# Patient Record
Sex: MALE | Race: WHITE | NOT HISPANIC OR LATINO | Employment: FULL TIME | URBAN - METROPOLITAN AREA
[De-identification: names, ages, dates, MRNs, and addresses within clinical notes are randomized per-mention and may not be internally consistent; named-entity substitution may affect disease eponyms.]

---

## 2017-08-04 ENCOUNTER — OFFICE VISIT (OUTPATIENT)
Dept: CARDIOLOGY | Facility: CLINIC | Age: 72
End: 2017-08-04

## 2017-08-04 VITALS
HEIGHT: 72 IN | SYSTOLIC BLOOD PRESSURE: 140 MMHG | HEART RATE: 63 BPM | DIASTOLIC BLOOD PRESSURE: 100 MMHG | BODY MASS INDEX: 31.29 KG/M2 | WEIGHT: 231 LBS

## 2017-08-04 DIAGNOSIS — I48.92 ATRIAL FLUTTER BY ELECTROCARDIOGRAM (HCC): Primary | ICD-10-CM

## 2017-08-04 PROCEDURE — 99204 OFFICE O/P NEW MOD 45 MIN: CPT | Performed by: INTERNAL MEDICINE

## 2017-08-04 PROCEDURE — 93000 ELECTROCARDIOGRAM COMPLETE: CPT | Performed by: INTERNAL MEDICINE

## 2017-08-04 RX ORDER — DORZOLAMIDE HCL 20 MG/ML
1 SOLUTION/ DROPS OPHTHALMIC 3 TIMES DAILY
COMMUNITY
End: 2018-09-14 | Stop reason: ALTCHOICE

## 2017-08-04 RX ORDER — GABAPENTIN 300 MG/1
300 CAPSULE ORAL DAILY PRN
COMMUNITY

## 2017-08-04 RX ORDER — SIMVASTATIN 5 MG
5 TABLET ORAL NIGHTLY
COMMUNITY

## 2017-08-04 RX ORDER — HYDROCODONE BITARTRATE AND ACETAMINOPHEN 7.5; 325 MG/1; MG/1
1 TABLET ORAL EVERY 6 HOURS PRN
COMMUNITY
End: 2021-12-01

## 2017-08-04 RX ORDER — TIMOLOL MALEATE 6.8 MG/ML
SOLUTION/ DROPS OPHTHALMIC
COMMUNITY

## 2017-08-04 RX ORDER — LOSARTAN POTASSIUM AND HYDROCHLOROTHIAZIDE 25; 100 MG/1; MG/1
1 TABLET ORAL DAILY
COMMUNITY
End: 2020-09-18

## 2017-08-04 RX ORDER — ALLOPURINOL 300 MG/1
300 TABLET ORAL DAILY
COMMUNITY

## 2017-08-04 RX ORDER — TRAVOPROST OPHTHALMIC SOLUTION 0.04 MG/ML
1 SOLUTION OPHTHALMIC AS NEEDED
COMMUNITY

## 2017-08-04 NOTE — PROGRESS NOTES
Subjective:     Encounter Date:08/04/2017      Patient ID: Jerry Elmore is a 72 y.o. male.    Chief Complaint:  Atrial Fibrillation   Presents for initial visit. Symptoms are negative for bradycardia, chest pain, dizziness, hypertension, palpitations, shortness of breath, syncope and tachycardia. The symptoms have been stable. Past medical history includes atrial fibrillation.       72-year-old gentleman who was referred to my office for new onset atrial flutter.  Patient was initially seen by Dr. Frost as an outpatient.  We attempted to alter his medications however after discontinuing his diuretic he immediately started gaining fluid and went into heart failure.  Dr. Frost had contacted me and we altered his medications again.  He presents today for further evaluation of his new onset atrial flutter.  Clinically patient is doing better.      Review of Systems   Cardiovascular: Negative for chest pain, palpitations and syncope.   Respiratory: Negative for shortness of breath.    Neurological: Negative for dizziness.   All other systems reviewed and are negative.        ECG 12 Lead  Date/Time: 8/4/2017 1:24 PM  Performed by: GONZALO CAMPA  Authorized by: GONZALO CAMPA   Comparison: compared with previous ECG from 8/4/2017  Similar to previous ECG  Rhythm: sinus rhythm  Clinical impression: normal ECG               Objective:     Physical Exam   Constitutional: He is oriented to person, place, and time. He appears well-developed.   HENT:   Head: Normocephalic.   Eyes: Conjunctivae are normal.   Neck: Normal range of motion.   Cardiovascular: Normal rate, regular rhythm and normal heart sounds.    Pulmonary/Chest: Breath sounds normal.   Abdominal: Soft. Bowel sounds are normal.   Musculoskeletal: Normal range of motion. He exhibits no edema.   Neurological: He is alert and oriented to person, place, and time.   Skin: Skin is warm and dry.   Psychiatric: He has a normal mood and affect. His  behavior is normal.   Vitals reviewed.      Lab Review:       Assessment:         No diagnosis found.       Plan:       1.  New onset Paroxysmal atrial flutter.  Patient was in flutter subsequently was referred because he didn't cardiovert.  He is attempted to be on a beta blocker his Hyzaar was stopped.  With that within 3 days he gained 9 pounds.  He is given one dose of Lasix and diuresed extensively.  His diastolic was discontinued his Hyzaar was resumed and he's been doing well.  He was also placed on Xarelto had no issues with that.  He presented to today for evaluation and found to be back in sinus rhythm.  At this point I would continue current plan of anticoagulation I'm going to try bystolic back to see if he can tolerate it.  As per conversation were obviously worried about blood pressure and heart rate he was aware of that and follow.    Atrial Fibrillation and Atrial Flutter  Assessment  • The patient has paroxysmal atrial flutter  • This is non-valvular in etiology  • The patient's CHADS2-VASc score is 2  • A [id number] score of 2 or more is considered a high risk for a thromboembolic event  • Rivaroxaban prescribed    Plan  • Attempt to maintain sinus rhythm  • Continue rivaroxaban for antithrombotic therapy, bleeding issues discussed  • Add beta blocker for rhythm control

## 2017-09-13 ENCOUNTER — OFFICE VISIT (OUTPATIENT)
Dept: CARDIOLOGY | Facility: CLINIC | Age: 72
End: 2017-09-13

## 2017-09-13 VITALS
DIASTOLIC BLOOD PRESSURE: 72 MMHG | HEIGHT: 72 IN | SYSTOLIC BLOOD PRESSURE: 120 MMHG | WEIGHT: 235 LBS | HEART RATE: 57 BPM | BODY MASS INDEX: 31.83 KG/M2

## 2017-09-13 DIAGNOSIS — I48.0 PAF (PAROXYSMAL ATRIAL FIBRILLATION) (HCC): Primary | ICD-10-CM

## 2017-09-13 PROCEDURE — 93000 ELECTROCARDIOGRAM COMPLETE: CPT | Performed by: INTERNAL MEDICINE

## 2017-09-13 PROCEDURE — 99213 OFFICE O/P EST LOW 20 MIN: CPT | Performed by: INTERNAL MEDICINE

## 2017-09-13 RX ORDER — METOPROLOL SUCCINATE 25 MG/1
25 TABLET, EXTENDED RELEASE ORAL DAILY
Qty: 30 TABLET | Refills: 11
Start: 2017-09-13

## 2017-09-22 NOTE — PROGRESS NOTES
Subjective:     Encounter Date:09/13/2017      Patient ID: Jerry Elmore is a 72 y.o. male.    Chief Complaint:  Atrial Fibrillation   Presents for initial visit. Symptoms are negative for bradycardia, chest pain, dizziness, hypertension, palpitations, shortness of breath, syncope and tachycardia. The symptoms have been stable. Past medical history includes atrial fibrillation.       Patient presents today for reevaluation.  Clinically he is doing significantly better than he was several weeks ago.  Palpitations shortness of breath edema lightheadedness chest pain or fatigue.    Review of Systems   Cardiovascular: Negative for chest pain, palpitations and syncope.   Respiratory: Negative for shortness of breath.    Neurological: Negative for dizziness.   All other systems reviewed and are negative.        ECG 12 Lead  Date/Time: 9/13/2017 12:24 PM  Performed by: GONAZLO CAMPA  Authorized by: GONZALO CAMPA   Comparison: compared with previous ECG from 8/4/2017  Similar to previous ECG  Rhythm: sinus rhythm  Ectopy: atrial premature contractions  Clinical impression: normal ECG               Objective:     Physical Exam   Constitutional: He is oriented to person, place, and time. He appears well-developed.   HENT:   Head: Normocephalic.   Eyes: Conjunctivae are normal.   Neck: Normal range of motion.   Cardiovascular: Normal rate, regular rhythm and normal heart sounds.    Pulmonary/Chest: Breath sounds normal.   Abdominal: Soft. Bowel sounds are normal.   Musculoskeletal: Normal range of motion. He exhibits no edema.   Neurological: He is alert and oriented to person, place, and time.   Skin: Skin is warm and dry.   Psychiatric: He has a normal mood and affect. His behavior is normal.   Vitals reviewed.      Lab Review:       Assessment:         No diagnosis found.       Plan:       1.  Paroxysmal atrial flutter.  Again he remains in sinus rhythm doing relatively well.  2.  Blood pressures  fantastic  3.  Continue the same follow-up in 6-12 months.    Atrial Fibrillation and Atrial Flutter  Assessment  • The patient has paroxysmal atrial flutter  • This is non-valvular in etiology  • The patient's CHADS2-VASc score is 2  • A [id number] score of 2 or more is considered a high risk for a thromboembolic event  • Rivaroxaban prescribed    Plan  • Attempt to maintain sinus rhythm  • Continue rivaroxaban for antithrombotic therapy, bleeding issues discussed  • Add beta blocker for rhythm control

## 2018-09-14 ENCOUNTER — OFFICE VISIT (OUTPATIENT)
Dept: CARDIOLOGY | Facility: CLINIC | Age: 73
End: 2018-09-14

## 2018-09-14 VITALS
HEART RATE: 65 BPM | BODY MASS INDEX: 31.15 KG/M2 | DIASTOLIC BLOOD PRESSURE: 70 MMHG | WEIGHT: 230 LBS | SYSTOLIC BLOOD PRESSURE: 118 MMHG | HEIGHT: 72 IN

## 2018-09-14 DIAGNOSIS — I48.0 PAF (PAROXYSMAL ATRIAL FIBRILLATION) (HCC): Primary | ICD-10-CM

## 2018-09-14 PROCEDURE — 93000 ELECTROCARDIOGRAM COMPLETE: CPT | Performed by: INTERNAL MEDICINE

## 2018-09-14 PROCEDURE — 99213 OFFICE O/P EST LOW 20 MIN: CPT | Performed by: INTERNAL MEDICINE

## 2018-09-14 NOTE — PROGRESS NOTES
Subjective:     Encounter Date:09/13/2017      Patient ID: Jerry Elmore is a 73 y.o. male.    Chief Complaint:  Atrial Fibrillation   Presents for initial visit. Symptoms are negative for bradycardia, chest pain, dizziness, hypertension, palpitations, shortness of breath, syncope and tachycardia. The symptoms have been stable. Past medical history includes atrial fibrillation.       Patient presents today for reevaluation.  Clinically he continues to do very well.  He's been a little short of breath here recently but attributes this to his allergies.  He's had no palpitations no edema lightheadedness chest pain fatigue.  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    Review of Systems   Cardiovascular: Negative for chest pain, palpitations and syncope.   Respiratory: Negative for shortness of breath.    Neurological: Negative for dizziness.   All other systems reviewed and are negative.        ECG 12 Lead  Date/Time: 9/14/2018 12:52 PM  Performed by: GONZALO CAMPA  Authorized by: GONZALO CAMPA   Comparison: compared with previous ECG from 9/13/2017  Comparison to previous ECG: Patient had PACs on old EKG not present today  Rhythm: sinus rhythm  Other findings: PRWP  Clinical impression: abnormal ECG               Objective:     Physical Exam   Constitutional: He is oriented to person, place, and time. He appears well-developed.   HENT:   Head: Normocephalic.   Eyes: Conjunctivae are normal.   Neck: Normal range of motion.   Cardiovascular: Normal rate, regular rhythm and normal heart sounds.    Pulmonary/Chest: Breath sounds normal.   Abdominal: Soft. Bowel sounds are normal.   Musculoskeletal: Normal range of motion. He exhibits no edema.   Neurological: He is alert and oriented to person, place, and time.   Skin: Skin is warm and dry.   Psychiatric: He has a normal mood and affect. His behavior is normal.   Vitals reviewed.      Lab Review:       Assessment:           Diagnosis Plan   1. PAF (paroxysmal atrial fibrillation) (CMS/[facility])            Plan:       1.  Paroxysmal atrial flutter.  Again he remains in sinus rhythm doing relatively well.  We had a fairly long discussion about continuation of his Xarelto.  At the current time it's recommended.  We'll reassess in about a year there is a lot of debate currently going on.  2.  Blood pressures fantastic  3.  Continue the same follow-up in 12 months.    Atrial Fibrillation and Atrial Flutter  Assessment  • The patient has paroxysmal atrial flutter  • This is non-valvular in etiology  • The patient's CHADS2-VASc score is 2  • A [id number] score of 2 or more is considered a high risk for a thromboembolic event  • Rivaroxaban prescribed    Plan  • Attempt to maintain sinus rhythm  • Continue rivaroxaban for antithrombotic therapy, bleeding issues discussed  • Add beta blocker for rhythm control

## 2019-09-20 ENCOUNTER — OFFICE VISIT (OUTPATIENT)
Dept: CARDIOLOGY | Facility: CLINIC | Age: 74
End: 2019-09-20

## 2019-09-20 VITALS
HEART RATE: 58 BPM | HEIGHT: 72 IN | SYSTOLIC BLOOD PRESSURE: 128 MMHG | BODY MASS INDEX: 30.61 KG/M2 | WEIGHT: 226 LBS | DIASTOLIC BLOOD PRESSURE: 84 MMHG

## 2019-09-20 DIAGNOSIS — I48.0 PAF (PAROXYSMAL ATRIAL FIBRILLATION) (HCC): Primary | ICD-10-CM

## 2019-09-20 PROCEDURE — 93000 ELECTROCARDIOGRAM COMPLETE: CPT | Performed by: INTERNAL MEDICINE

## 2019-09-20 PROCEDURE — 99213 OFFICE O/P EST LOW 20 MIN: CPT | Performed by: INTERNAL MEDICINE

## 2019-09-30 NOTE — PROGRESS NOTES
Subjective:     Encounter Date:09/20/19      Patient ID: Jerry Elmore is a 74 y.o. male.    Chief Complaint:  Atrial Fibrillation   Presents for initial visit. Symptoms are negative for bradycardia, chest pain, dizziness, hypertension, palpitations, shortness of breath, syncope and tachycardia. The symptoms have been stable. Past medical history includes atrial fibrillation.       74-year-old gentleman with a history of atrial fibrillation presents today for reevaluation.  Patient continues to do well he is completely asymptomatic.  No chest pain shortness of breath palpitations syncope near syncope lightheadedness swelling or fatigue    Review of Systems   Cardiovascular: Negative for chest pain, palpitations and syncope.   Respiratory: Negative for shortness of breath.    Neurological: Negative for dizziness.   All other systems reviewed and are negative.        ECG 12 Lead  Date/Time: 9/20/2019 11:24 AM  Performed by: Aram Barbosa MD  Authorized by: Aram Barbosa MD   Comparison: compared with previous ECG from 9/14/2018  Similar to previous ECG  Rhythm: atrial fibrillation  Other findings: poor R wave progression    Clinical impression: abnormal EKG               Objective:     Physical Exam   Constitutional: He is oriented to person, place, and time. He appears well-developed.   HENT:   Head: Normocephalic.   Eyes: Conjunctivae are normal.   Neck: Normal range of motion.   Cardiovascular: Normal rate and normal heart sounds. An irregularly irregular rhythm present.   Pulmonary/Chest: Breath sounds normal.   Abdominal: Soft. Bowel sounds are normal.   Musculoskeletal: Normal range of motion. He exhibits no edema.   Neurological: He is alert and oriented to person, place, and time.   Skin: Skin is warm and dry.   Psychiatric: He has a normal mood and affect. His behavior is normal.   Vitals reviewed.      Lab Review:       Assessment:          Diagnosis Plan   1. PAF (paroxysmal atrial  fibrillation) (CMS/[facility])            Plan:       1.  Paroxysmal atrial flutter.  Again he remains in sinus rhythm doing relatively well.  We had a fairly long discussion about continuation of his Xarelto.  At the current time it's recommended.  We'll reassess in about a year there is a lot of debate currently going on.  2.  Blood pressures good  3.  Continue the same follow-up in 12 months.    Atrial Fibrillation and Atrial Flutter  Assessment  • The patient has paroxysmal atrial flutter  • This is non-valvular in etiology  • The patient's CHADS2-VASc score is 2  • A [id number] score of 2 or more is considered a high risk for a thromboembolic event  • Rivaroxaban prescribed    Plan  • Attempt to maintain sinus rhythm  • Continue rivaroxaban for antithrombotic therapy, bleeding issues discussed  • Add beta blocker for rhythm control

## 2020-09-18 ENCOUNTER — OFFICE VISIT (OUTPATIENT)
Dept: CARDIOLOGY | Facility: CLINIC | Age: 75
End: 2020-09-18

## 2020-09-18 VITALS
BODY MASS INDEX: 30.48 KG/M2 | HEIGHT: 72 IN | SYSTOLIC BLOOD PRESSURE: 104 MMHG | HEART RATE: 54 BPM | DIASTOLIC BLOOD PRESSURE: 70 MMHG | WEIGHT: 225 LBS

## 2020-09-18 DIAGNOSIS — I48.20 ATRIAL FIBRILLATION, CHRONIC (HCC): Primary | ICD-10-CM

## 2020-09-18 PROBLEM — I48.0 PAF (PAROXYSMAL ATRIAL FIBRILLATION) (HCC): Status: RESOLVED | Noted: 2018-09-14 | Resolved: 2020-09-18

## 2020-09-18 PROCEDURE — 99214 OFFICE O/P EST MOD 30 MIN: CPT | Performed by: INTERNAL MEDICINE

## 2020-09-18 PROCEDURE — 93000 ELECTROCARDIOGRAM COMPLETE: CPT | Performed by: INTERNAL MEDICINE

## 2020-09-18 RX ORDER — LOSARTAN POTASSIUM 50 MG/1
50 TABLET ORAL DAILY
Start: 2020-09-18 | End: 2020-12-22 | Stop reason: ALTCHOICE

## 2020-09-18 NOTE — PROGRESS NOTES
Subjective:     Encounter Date:09/18/2020      Patient ID: Jerry Elmore is a 75 y.o. male.    Chief Complaint:  Atrial Fibrillation  Presents for follow-up visit. The symptoms have been stable. Past medical history includes atrial fibrillation.       75-year-old gentleman who presents today for reevaluation.  Clinically he is doing great no issues.  His blood pressures been running a little bit on the low side.  Is been getting about 104 or so at home but it was in the office today.  He is asymptomatic but still I think we can cut back on some of his medications.  He denies palpitations shortness of breath dizziness lightheadedness syncope near syncope fatigue or chest pains.    Review of Systems   All other systems reviewed and are negative.        ECG 12 Lead    Date/Time: 9/18/2020 3:36 PM  Performed by: Aram Barbosa MD  Authorized by: Aram Barbosa MD   Comparison: compared with previous ECG from 9/20/2019  Similar to previous ECG  Rhythm: atrial fibrillation    Clinical impression: abnormal EKG               Objective:     Vitals signs reviewed.   Constitutional:       Appearance: Well-developed.   Eyes:      Conjunctiva/sclera: Conjunctivae normal.   HENT:      Head: Normocephalic.   Neck:      Musculoskeletal: Normal range of motion.   Pulmonary:      Breath sounds: Normal breath sounds.   Cardiovascular:      Normal rate. Irregularly irregular rhythm.   Edema:     Peripheral edema absent.   Abdominal:      General: Bowel sounds are normal.      Palpations: Abdomen is soft.   Musculoskeletal: Normal range of motion.   Skin:     General: Skin is warm and dry.   Neurological:      Mental Status: Alert and oriented to person, place, and time.   Psychiatric:         Behavior: Behavior normal.         Lab Review:       Assessment:          Diagnosis Plan   1. Atrial fibrillation, chronic (CMS/HCC)            Plan:       1.  Chronic atrial fibrillation.  Continues to do very well he is on a  good medical regimen.  2.  Blood pressures running a little bit on the low side.  I told him to stop his diuretic which he is currently taking HCTZ.  We will follow his blood pressure for a few weeks and then I would cut his Cozaar in half when to keep his blood pressure about 120.  We will see how he does with that we will reassess in several months and go from there.

## 2020-12-22 ENCOUNTER — OFFICE VISIT (OUTPATIENT)
Dept: CARDIOLOGY | Facility: CLINIC | Age: 75
End: 2020-12-22

## 2020-12-22 VITALS
DIASTOLIC BLOOD PRESSURE: 74 MMHG | SYSTOLIC BLOOD PRESSURE: 115 MMHG | OXYGEN SATURATION: 97 % | WEIGHT: 223 LBS | BODY MASS INDEX: 30.2 KG/M2 | HEIGHT: 72 IN

## 2020-12-22 DIAGNOSIS — I48.20 ATRIAL FIBRILLATION, CHRONIC (HCC): Primary | ICD-10-CM

## 2020-12-22 PROCEDURE — 99213 OFFICE O/P EST LOW 20 MIN: CPT | Performed by: INTERNAL MEDICINE

## 2020-12-22 RX ORDER — LOSARTAN POTASSIUM 100 MG/1
100 TABLET ORAL DAILY
COMMUNITY

## 2020-12-22 RX ORDER — LOSARTAN POTASSIUM 100 MG/1
TABLET ORAL
COMMUNITY
Start: 2020-11-23 | End: 2020-12-22 | Stop reason: ALTCHOICE

## 2020-12-22 NOTE — PROGRESS NOTES
Subjective:     Encounter Date:12/22/2020      Patient ID: Jerry Elmore is a 75 y.o. male.    Chief Complaint:  Atrial Fibrillation  Presents for follow-up visit. The symptoms have been stable. Past medical history includes atrial fibrillation.       75-year-old gentleman with chronic A. fib doing well with no complaints.  He denies any types of symptoms.  Stop his diuretic last time I saw him.  His blood pressures remain stable in a very good level.  Review of Systems   All other systems reviewed and are negative.      Procedures       Objective:     Vitals signs reviewed.   Constitutional:       Appearance: Well-developed.   Eyes:      Conjunctiva/sclera: Conjunctivae normal.   HENT:      Head: Normocephalic.   Neck:      Musculoskeletal: Normal range of motion.   Pulmonary:      Breath sounds: Normal breath sounds.   Cardiovascular:      Normal rate. Irregularly irregular rhythm.   Edema:     Peripheral edema absent.   Abdominal:      General: Bowel sounds are normal.      Palpations: Abdomen is soft.   Musculoskeletal: Normal range of motion.   Skin:     General: Skin is warm and dry.   Neurological:      Mental Status: Alert and oriented to person, place, and time.   Psychiatric:         Behavior: Behavior normal.         Lab Review:       Assessment:          Diagnosis Plan   1. Atrial fibrillation, chronic (CMS/[facility])            Plan:       1.  Atrial fibrillation stable  2.  Blood pressure still running good off hydrochlorothiazide which continues to the office.  His blood pressure drops at all over he gets symptomatic I would consider cutting his Cozaar in half.  Currently he has no symptoms his blood pressure remains about 115 I would continue the same follow-up in about a year or so.

## 2021-03-15 ENCOUNTER — BULK ORDERING (OUTPATIENT)
Dept: CASE MANAGEMENT | Facility: OTHER | Age: 76
End: 2021-03-15

## 2021-03-15 DIAGNOSIS — Z23 IMMUNIZATION DUE: ICD-10-CM

## 2021-12-01 ENCOUNTER — OFFICE VISIT (OUTPATIENT)
Dept: CARDIOLOGY | Facility: CLINIC | Age: 76
End: 2021-12-01

## 2021-12-01 VITALS
WEIGHT: 216.8 LBS | DIASTOLIC BLOOD PRESSURE: 60 MMHG | BODY MASS INDEX: 29.36 KG/M2 | HEIGHT: 72 IN | HEART RATE: 68 BPM | SYSTOLIC BLOOD PRESSURE: 110 MMHG

## 2021-12-01 DIAGNOSIS — I48.20 ATRIAL FIBRILLATION, CHRONIC (HCC): Primary | ICD-10-CM

## 2021-12-01 PROCEDURE — 93000 ELECTROCARDIOGRAM COMPLETE: CPT | Performed by: INTERNAL MEDICINE

## 2021-12-01 PROCEDURE — 99213 OFFICE O/P EST LOW 20 MIN: CPT | Performed by: INTERNAL MEDICINE

## 2021-12-01 RX ORDER — HYDROCHLOROTHIAZIDE 25 MG/1
25 TABLET ORAL DAILY
COMMUNITY
Start: 2021-10-09

## 2021-12-03 NOTE — PROGRESS NOTES
"      CARDIOLOGY    Aram Barbosa MD    ENCOUNTER DATE:  12/01/2021    Jerry Elmore / 76 y.o. / male        CHIEF COMPLAINT / REASON FOR OFFICE VISIT     Chronic atrial fibrillation    HISTORY OF PRESENT ILLNESS       HPI  Jerry Elmore is a 76 y.o. male who presents today for elevation.  Clinically is doing great says he feels good denies chest pain shortness of breath palpitations lightheadedness swelling fatigue.      The following portions of the patient's history were reviewed and updated as appropriate: allergies, current medications, past family history, past medical history, past social history, past surgical history and problem list.      VITAL SIGNS     Visit Vitals  /60 (BP Location: Left arm)   Pulse 68   Ht 182.9 cm (72\")   Wt 98.3 kg (216 lb 12.8 oz)   BMI 29.40 kg/m²         Wt Readings from Last 3 Encounters:   12/01/21 98.3 kg (216 lb 12.8 oz)   12/22/20 101 kg (223 lb)   09/18/20 102 kg (225 lb)     Body mass index is 29.4 kg/m².      REVIEW OF SYSTEMS   ROS        PHYSICAL EXAMINATION     Constitutional:       Appearance: Healthy appearance.   Pulmonary:      Breath sounds: Normal breath sounds.   Cardiovascular:      PMI at left midclavicular line. Normal rate. Irregularly irregular rhythm. Normal S1. Normal S2.      Murmurs: There is no murmur.      No gallop. No click. No rub.   Pulses:     Intact distal pulses.   Edema:     Peripheral edema absent.   Neurological:      Mental Status: Alert and oriented to person, place and time.           REVIEWED DATA       ECG 12 Lead    Date/Time: 12/1/2021 12:11 PM  Performed by: Aram Barbosa MD  Authorized by: Aram Barbosa MD   Comparison: compared with previous ECG from 9/18/2020  Similar to previous ECG  Rhythm: atrial fibrillation  Other findings: non-specific ST-T wave changes and poor R wave progression    Clinical impression: abnormal EKG            Cardiac Procedures:  1.           ASSESSMENT & PLAN      Diagnosis " Plan   1. Atrial fibrillation, chronic (HCC)           SUMMARY/DISCUSSION  1. Chronic atrial fibrillation.  Clinically patient is doing good no issues.  Blood pressure is excellent tolerating all his medications.  Patient was told to continue the same follow-up in 1 to 2 years sooner if issues arise.        MEDICATIONS         Discharge Medications          Accurate as of December 1, 2021 11:59 PM. If you have any questions, ask your nurse or doctor.            Continue These Medications      Instructions Start Date   allopurinol 300 MG tablet  Commonly known as: ZYLOPRIM   300 mg, Oral, Daily      apixaban 5 MG tablet tablet  Commonly known as: ELIQUIS   5 mg, Oral, 2 Times Daily      gabapentin 300 MG capsule  Commonly known as: NEURONTIN   300 mg, Oral, Daily PRN      hydroCHLOROthiazide 25 MG tablet  Commonly known as: HYDRODIURIL   25 mg, Oral, Daily      losartan 100 MG tablet  Commonly known as: COZAAR   100 mg, Oral, Daily      metoprolol succinate XL 25 MG 24 hr tablet  Commonly known as: TOPROL-XL   25 mg, Oral, Daily      simvastatin 5 MG tablet  Commonly known as: ZOCOR   5 mg, Oral, Nightly      Timolol Maleate 0.5 % (DAILY) solution   Ophthalmic      travoprost (BAK free) 0.004 % solution ophthalmic solution  Commonly known as: TRAVATAN   1 drop, As Needed, in affected eye(s)          Stop These Medications    HYDROcodone-acetaminophen 7.5-325 MG per tablet  Commonly known as: NORCO  Stopped by: Aram Barbosa MD                **Kimon Disclaimer:   Much of this encounter note is an electronic transcription/translation of spoken language to printed text. The electronic translation of spoken language may permit erroneous, or at times, nonsensical words or phrases to be inadvertently transcribed. Although I have reviewed the note for such errors, some may still exist.

## 2023-01-26 ENCOUNTER — OFFICE VISIT (OUTPATIENT)
Dept: ORTHOPEDIC SURGERY | Facility: CLINIC | Age: 78
End: 2023-01-26
Payer: MEDICARE

## 2023-01-26 VITALS — TEMPERATURE: 97.1 F | WEIGHT: 221.2 LBS | HEIGHT: 72 IN | BODY MASS INDEX: 29.96 KG/M2

## 2023-01-26 DIAGNOSIS — M17.12 PRIMARY OSTEOARTHRITIS OF LEFT KNEE: Primary | ICD-10-CM

## 2023-01-26 PROCEDURE — 73562 X-RAY EXAM OF KNEE 3: CPT | Performed by: ORTHOPAEDIC SURGERY

## 2023-01-26 PROCEDURE — 99204 OFFICE O/P NEW MOD 45 MIN: CPT | Performed by: ORTHOPAEDIC SURGERY

## 2023-01-26 RX ORDER — CHLORHEXIDINE GLUCONATE 500 MG/1
CLOTH TOPICAL 2 TIMES DAILY
Status: CANCELLED | OUTPATIENT
Start: 2023-01-26

## 2023-01-26 RX ORDER — PREGABALIN 150 MG/1
150 CAPSULE ORAL ONCE
Status: CANCELLED | OUTPATIENT
Start: 2023-01-26 | End: 2023-01-26

## 2023-01-26 RX ORDER — MELOXICAM 7.5 MG/1
15 TABLET ORAL ONCE
Status: CANCELLED | OUTPATIENT
Start: 2023-01-26 | End: 2023-01-26

## 2023-01-26 RX ORDER — HYDROCODONE BITARTRATE AND ACETAMINOPHEN 7.5; 325 MG/1; MG/1
TABLET ORAL
COMMUNITY
Start: 2023-01-20

## 2023-01-26 NOTE — PROGRESS NOTES
Patient: Jerry Elmore  YOB: 1945 77 y.o. male  Medical Record Number: [id number]    Chief Complaints:   Chief Complaint   Patient presents with   • Left Knee - Pain, Initial Evaluation       History of Present Illness:Jerry Elmore is a 77 y.o. male who presents with left knee instabiity and pain. Has lumbar DDD s/p fusin -  Takes intermittent narcotics. Limits adls and walking tolerance has had previous treatment in the past including injections therapy exercises.  He works out on a regular basis working on strengthening his legs but despite this still has pain and instability which has progressed to the point where he is quite limited.    Allergies:   Allergies   Allergen Reactions   • Penicillins        Medications:   Current Outpatient Medications   Medication Sig Dispense Refill   • allopurinol (ZYLOPRIM) 300 MG tablet Take 300 mg by mouth Daily.     • apixaban (ELIQUIS) 5 MG tablet tablet Take 5 mg by mouth 2 (Two) Times a Day.     • hydroCHLOROthiazide (HYDRODIURIL) 25 MG tablet Take 25 mg by mouth Daily.     • HYDROcodone-acetaminophen (NORCO) 7.5-325 MG per tablet TAKE 1 TABLET ORALLY EVERY 4-6 HRS 30 DAYS     • losartan (COZAAR) 100 MG tablet Take 100 mg by mouth Daily.     • metoprolol succinate XL (TOPROL-XL) 25 MG 24 hr tablet Take 1 tablet by mouth Daily. 30 tablet 11   • simvastatin (ZOCOR) 5 MG tablet Take 5 mg by mouth Every Night.     • Timolol Maleate 0.5 % (DAILY) solution Apply  to eye.     • travoprost, BAK free, (TRAVATAN) 0.004 % solution ophthalmic solution 1 drop As Needed. in affected eye(s)     • gabapentin (NEURONTIN) 300 MG capsule Take 300 mg by mouth Daily As Needed.       No current facility-administered medications for this visit.         The following portions of the patient's history were reviewed and updated as appropriate: allergies, current medications, past family history, past medical history, past social history, past surgical history and problem  "list.    Review of Systems:   A 14 point review of systems was performed. All systems negative except pertinent positives/negative listed in HPI above    Physical Exam:   Vitals:    01/26/23 0803   Temp: 97.1 °F (36.2 °C)   TempSrc: Temporal   Weight: 100 kg (221 lb 3.2 oz)   Height: 182.9 cm (72\")   PainSc:   4   PainLoc: Knee       General: A and O x 3, ASA, NAD    SCLERA:    Normal    DENTITION:   Normal   Knee:  left    ALIGNMENT:     Valgus      GAIT:    Antalgic    SKIN:    No abnormality    RANGE OF MOTION:   3  -  120   DEG    STRENGTH:   4  / 5    LIGAMENTS:    No varus / valgus instability.   Negative  Lachman.    MENISCUS:     Negative   Cassidy       DISTAL PULSES:    Paplable    DISTAL SENSATION :   Intact    LYMPHATICS:     No   lymphadenopathy    OTHER:          - Positive   effusion      - Crepitance with ROM       Radiology:  Xrays 3views left knee (ap,lateral, sunrise) were ordered and reviewed for evaluation of knee pain demonstrating advanced valgus osteoarthritis with bone on bone articulation, subchondral cysts, and periarticular osteophytes    Assessment/Plan: Left knee endstage OA.  Continuation of conservative management vs. TKA discussed.  The patient wishes to proceed with total knee replacement.  At this point the patient has failed the full compliment of conservative treatment and stating complete understanding of the risks/benefits/ anternatives wishes to proceed with surgical treatment.    Risk and benefits of surgery were reviewed.  Including, but not limited to, blood clots or pulmonary embolism, anesthesia risk, infection, fracture, skin/leg numbness, persistent pain/crepitance/popping/catching, failure of the implant, need for future surgeries, hematoma, possible nerve or blood vessel injury, need for transfusion, and potential risk of stroke,heart attack or death, among others.  The patient understands and wishes to proceed.     It was explained that if tissue has been repaired " or reconstructed, there is also an increased chance of failure which may require further management.  Following the completion of the discussion, the patient expressed understanding of this planned course of care, all their questions were answered and consent will be obtained preoperatively.    Operative Plan: Left Smith and Nephellen Oxinium Total Knee Replacement performing the procedure on an outpatient basiswith home health rehab-we will get cardiac clearance from Dr. Ignacio and will need to be off Eliquis for at least 48 hours        Junior Mancuso MD  1/26/2023

## 2023-04-28 ENCOUNTER — OFFICE VISIT (OUTPATIENT)
Dept: CARDIOLOGY | Facility: CLINIC | Age: 78
End: 2023-04-28
Payer: MEDICARE

## 2023-04-28 VITALS
OXYGEN SATURATION: 97 % | WEIGHT: 218 LBS | BODY MASS INDEX: 29.53 KG/M2 | HEIGHT: 72 IN | SYSTOLIC BLOOD PRESSURE: 108 MMHG | HEART RATE: 67 BPM | DIASTOLIC BLOOD PRESSURE: 70 MMHG

## 2023-04-28 DIAGNOSIS — I48.20 ATRIAL FIBRILLATION, CHRONIC: Primary | ICD-10-CM

## 2023-04-28 PROCEDURE — 99213 OFFICE O/P EST LOW 20 MIN: CPT | Performed by: INTERNAL MEDICINE

## 2023-04-28 PROCEDURE — 93000 ELECTROCARDIOGRAM COMPLETE: CPT | Performed by: INTERNAL MEDICINE

## 2023-04-28 RX ORDER — IPRATROPIUM BROMIDE 42 UG/1
SPRAY, METERED NASAL
COMMUNITY
Start: 2023-04-04

## 2023-04-28 RX ORDER — BIMATOPROST 0.01 %
1 DROPS OPHTHALMIC (EYE)
COMMUNITY
Start: 2023-02-15

## 2023-04-28 NOTE — PROGRESS NOTES
"      CARDIOLOGY    Aram Barbosa MD    ENCOUNTER DATE:  04/28/2023    Jerry Elmore / 77 y.o. / male        CHIEF COMPLAINT / REASON FOR OFFICE VISIT     Atrial fibrillation, chronic      HISTORY OF PRESENT ILLNESS       HPI  Jerry Elmore is a 77 y.o. male who presents today for reevaluation.  Clinically patient is doing great no chest pain shortness of breath palpitations lightheadedness swelling or fatigue.      The following portions of the patient's history were reviewed and updated as appropriate: allergies, current medications, past family history, past medical history, past social history, past surgical history and problem list.      VITAL SIGNS     Visit Vitals  /70   Pulse 67   Ht 182.9 cm (72\")   Wt 98.9 kg (218 lb)   SpO2 97%   BMI 29.57 kg/m²         Wt Readings from Last 3 Encounters:   04/28/23 98.9 kg (218 lb)   01/26/23 100 kg (221 lb 3.2 oz)   12/01/21 98.3 kg (216 lb 12.8 oz)     Body mass index is 29.57 kg/m².      REVIEW OF SYSTEMS   ROS        PHYSICAL EXAMINATION     Vitals reviewed.   Constitutional:       Appearance: Healthy appearance.   Pulmonary:      Effort: Pulmonary effort is normal.   Cardiovascular:      Normal rate. Irregularly irregular rhythm. Normal S1. Normal S2.      Murmurs: There is no murmur.      No gallop. No click. No rub.   Pulses:     Intact distal pulses.   Edema:     Peripheral edema absent.   Neurological:      Mental Status: Alert and oriented to person, place and time.           REVIEWED DATA       ECG 12 Lead    Date/Time: 4/28/2023 10:08 AM  Performed by: Aram Barbosa MD  Authorized by: Aram Barbosa MD   Comparison: compared with previous ECG from 12/1/2021  Similar to previous ECG  Rhythm: atrial fibrillation  Other findings: non-specific ST-T wave changes    Clinical impression: abnormal EKG            Cardiac Procedures:  1.           ASSESSMENT & PLAN      Diagnosis Plan   1. Atrial fibrillation, chronic      "         SUMMARY/DISCUSSION  1. Chronic A-fib.  Clinically patient is doing well no issues.  ECG is unchanged.  In light of that I would recommend no further changes.  We did discuss Watchman device I do not think he is a good candidate for that.  Told to follow-up in a year sooner if any issues should arise.        MEDICATIONS         Discharge Medications          Accurate as of April 28, 2023 10:08 AM. If you have any questions, ask your nurse or doctor.            Continue These Medications      Instructions Start Date   allopurinol 300 MG tablet  Commonly known as: ZYLOPRIM   300 mg, Oral, Daily      apixaban 5 MG tablet tablet  Commonly known as: ELIQUIS   5 mg, Oral, 2 Times Daily      gabapentin 300 MG capsule  Commonly known as: NEURONTIN   300 mg, Oral, Daily PRN      hydroCHLOROthiazide 25 MG tablet  Commonly known as: HYDRODIURIL   25 mg, Oral, Daily      HYDROcodone-acetaminophen 7.5-325 MG per tablet  Commonly known as: NORCO   TAKE 1 TABLET ORALLY EVERY 4-6 HRS 30 DAYS      ipratropium 0.06 % nasal spray  Commonly known as: ATROVENT   INSTILL 2 SPRAYS IN EACH NOSTRIL THREE TIMES A DAY 30 DAY(S) 90      losartan 100 MG tablet  Commonly known as: COZAAR   100 mg, Oral, Daily      Lumigan 0.01 % ophthalmic drops  Generic drug: bimatoprost   1 drop, Both Eyes, Every Night at Bedtime      metoprolol succinate XL 25 MG 24 hr tablet  Commonly known as: TOPROL-XL   25 mg, Oral, Daily      simvastatin 5 MG tablet  Commonly known as: ZOCOR   5 mg, Oral, Nightly      Timolol Maleate (Once-Daily) 0.5 % solution   Ophthalmic      travoprost (BAK free) 0.004 % solution ophthalmic solution  Commonly known as: TRAVATAN   1 drop, As Needed, in affected eye(s)                  **Dragon Disclaimer:   Much of this encounter note is an electronic transcription/translation of spoken language to printed text. The electronic translation of spoken language may permit erroneous, or at times, nonsensical words or phrases to be  inadvertently transcribed. Although I have reviewed the note for such errors, some may still exist.

## 2024-04-30 ENCOUNTER — OFFICE VISIT (OUTPATIENT)
Dept: CARDIOLOGY | Facility: CLINIC | Age: 79
End: 2024-04-30
Payer: MEDICARE

## 2024-04-30 VITALS
HEART RATE: 50 BPM | OXYGEN SATURATION: 95 % | WEIGHT: 218 LBS | DIASTOLIC BLOOD PRESSURE: 80 MMHG | BODY MASS INDEX: 42.8 KG/M2 | SYSTOLIC BLOOD PRESSURE: 120 MMHG | HEIGHT: 60 IN

## 2024-04-30 DIAGNOSIS — I48.20 ATRIAL FIBRILLATION, CHRONIC: Primary | ICD-10-CM

## 2024-04-30 PROCEDURE — 93000 ELECTROCARDIOGRAM COMPLETE: CPT | Performed by: NURSE PRACTITIONER

## 2024-04-30 PROCEDURE — 99213 OFFICE O/P EST LOW 20 MIN: CPT | Performed by: NURSE PRACTITIONER

## 2024-04-30 NOTE — ASSESSMENT & PLAN NOTE
Patient with longstanding history of atrial fibrillation  Heart rate currently controlled  Continue metoprolol succinate 25 mg/day  Anticoagulated with apixaban and denies bleeding complications

## 2024-04-30 NOTE — PROGRESS NOTES
"    CARDIOLOGY        Patient Name: Jerry Elmore  :1945  Age: 78 y.o.  Primary Cardiologist: Aram Barbosa MD  Encounter Provider:  CAROLYN Baker    Date of Service: 24    CHIEF COMPLAINT / REASON FOR OFFICE VISIT     Follow-Up and Atrial Fibrillation      HISTORY OF PRESENT ILLNESS       HPI  Jerry Elmore is a 78 y.o. male who presents today for annual assessment.     Pt has a  history significant for persistent atrial fibrillation.    Patient states that he has done well since the last assessment. He is stable from a cardiac standpoint.  He is asymptomatic and denies any episodes of chest pain, shortness of breath, palpitations, lightheadedness, swelling or fatigue.   He is tolerating all medications without adverse effects.     The following portions of the patient's history were reviewed and updated as appropriate: allergies, current medications, past family history, past medical history, past social history, past surgical history and problem list.      VITAL SIGNS     Visit Vitals  /80 (BP Location: Right arm, Patient Position: Sitting)   Pulse 50   Ht 72 cm (28.35\")   Wt 98.9 kg (218 lb)   SpO2 95%   .75 kg/m²         Wt Readings from Last 3 Encounters:   24 98.9 kg (218 lb)   23 98.9 kg (218 lb)   23 100 kg (221 lb 3.2 oz)     Body mass index is 190.75 kg/m².      REVIEW OF SYSTEMS     Review of Systems   Constitutional: Negative for chills, fever, weight gain and weight loss.   Cardiovascular:  Negative for leg swelling.   Respiratory:  Negative for cough, snoring and wheezing.    Hematologic/Lymphatic: Negative for bleeding problem. Does not bruise/bleed easily.   Skin:  Negative for color change.   Musculoskeletal:  Negative for falls, joint pain and myalgias.   Gastrointestinal:  Negative for melena.   Genitourinary:  Negative for hematuria.   Neurological:  Negative for excessive daytime sleepiness.   Psychiatric/Behavioral:  Negative for " "depression. The patient is not nervous/anxious.            PHYSICAL EXAMINATION     Constitutional:       Appearance: Normal appearance. Well-developed.   Eyes:      Conjunctiva/sclera: Conjunctivae normal.   Neck:      Vascular: No carotid bruit.   Pulmonary:      Effort: Pulmonary effort is normal.      Breath sounds: Normal breath sounds.   Cardiovascular:      Normal rate. Regular rhythm. Normal S1. Normal S2.       Murmurs: There is no murmur.      No gallop.  No click. No rub.   Edema:     Peripheral edema absent.   Musculoskeletal: Normal range of motion. Skin:     General: Skin is warm and dry.   Neurological:      Mental Status: Alert and oriented to person, place, and time.      GCS: GCS eye subscore is 4. GCS verbal subscore is 5. GCS motor subscore is 6.   Psychiatric:         Speech: Speech normal.         Behavior: Behavior normal.         Thought Content: Thought content normal.         Judgment: Judgment normal.           REVIEWED DATA       ECG 12 Lead    Date/Time: 4/30/2024 10:09 AM  Performed by: Augusta Arguelles APRN    Authorized by: Augusta Arguelles APRN  Comparison: compared with previous ECG from 4/28/2023  Similar to previous ECG  Rhythm: atrial fibrillation  Rate: bradycardic  BPM: 50  ST Segments: ST segments normal  T Waves: T waves normal  QRS axis: normal    Clinical impression: abnormal EKG            No results found for: \"NA\", \"K\", \"CL\", \"CO2\", \"BUN\", \"CREATININE\", \"EGFRIFNONA\", \"EGFRIFAFRI\", \"GLUCOSE\", \"CALCIUM\", \"PROTENTOTREF\", \"ALBUMIN\", \"BILITOT\", \"AST\", \"ALT\"  No results found for: \"WBC\", \"HGB\", \"HCT\", \"MCV\", \"PLT\"  No results found for: \"PROBNP\", \"BNP\"  No results found for: \"CKTOTAL\", \"CKMB\", \"CKMBINDEX\", \"TROPONINI\", \"TROPONINT\"  No results found for: \"TSH\"          ASSESSMENT & PLAN     Diagnoses and all orders for this visit:    1. Atrial fibrillation, chronic (Primary)  Assessment & Plan:  Patient with longstanding history of atrial fibrillation  Heart rate currently " controlled  Continue metoprolol succinate 25 mg/day  Anticoagulated with apixaban and denies bleeding complications      Orders:  -     ECG 12 Lead        Return in about 1 year (around 4/30/2025) for Dr. Barbosa- Routine.    Future Appointments         Provider Department Center    5/2/2025 11:40 AM Aram Barbosa MD [facility] CARDIOLOGY [place]              MEDICATIONS         Discharge Medications            Accurate as of April 30, 2024  4:04 PM. If you have any questions, ask your nurse or doctor.                Continue These Medications        Instructions Start Date   allopurinol 300 MG tablet  Commonly known as: ZYLOPRIM   300 mg, Oral, Daily      apixaban 5 MG tablet tablet  Commonly known as: ELIQUIS   5 mg, Oral, 2 Times Daily      gabapentin 300 MG capsule  Commonly known as: NEURONTIN   300 mg, Oral, Daily PRN      hydroCHLOROthiazide 25 MG tablet   25 mg, Oral, Daily      HYDROcodone-acetaminophen 7.5-325 MG per tablet  Commonly known as: NORCO   TAKE 1 TABLET ORALLY EVERY 4-6 HRS 30 DAYS      ipratropium 0.06 % nasal spray  Commonly known as: ATROVENT   INSTILL 2 SPRAYS IN EACH NOSTRIL THREE TIMES A DAY 30 DAY(S) 90      losartan 100 MG tablet  Commonly known as: COZAAR   100 mg, Oral, Daily      Lumigan 0.01 % ophthalmic drops  Generic drug: bimatoprost   1 drop, Both Eyes, Every Night at Bedtime      metoprolol succinate XL 25 MG 24 hr tablet  Commonly known as: TOPROL-XL   25 mg, Oral, Daily      simvastatin 5 MG tablet  Commonly known as: ZOCOR   5 mg, Oral, Nightly      Timolol Maleate (Once-Daily) 0.5 % solution   Ophthalmic      travoprost (BAK free) 0.004 % solution ophthalmic solution  Commonly known as: TRAVATAN   1 drop, As Needed, in affected eye(s)                    **Dragon Disclaimer:   Much of this encounter note is an electronic transcription/translation of spoken language to printed text. The electronic translation of spoken language may permit erroneous, or at  times, nonsensical words or phrases to be inadvertently transcribed. Although I have reviewed the note for such errors, some may still exist.

## 2025-05-02 ENCOUNTER — OFFICE VISIT (OUTPATIENT)
Dept: CARDIOLOGY | Age: 80
End: 2025-05-02
Payer: MEDICARE

## 2025-05-02 VITALS
SYSTOLIC BLOOD PRESSURE: 118 MMHG | HEIGHT: 72 IN | BODY MASS INDEX: 27.36 KG/M2 | WEIGHT: 202 LBS | DIASTOLIC BLOOD PRESSURE: 70 MMHG | HEART RATE: 46 BPM

## 2025-05-02 DIAGNOSIS — I48.20 ATRIAL FIBRILLATION, CHRONIC: Primary | ICD-10-CM

## 2025-05-02 PROCEDURE — 99214 OFFICE O/P EST MOD 30 MIN: CPT | Performed by: INTERNAL MEDICINE

## 2025-05-02 PROCEDURE — 93000 ELECTROCARDIOGRAM COMPLETE: CPT | Performed by: INTERNAL MEDICINE

## 2025-05-02 RX ORDER — METOPROLOL SUCCINATE 25 MG/1
12.5 TABLET, EXTENDED RELEASE ORAL DAILY
Start: 2025-05-02

## 2025-05-02 NOTE — PROGRESS NOTES
"      CARDIOLOGY    Aram Barbosa MD    ENCOUNTER DATE:  05/02/2025    Jerry Elmore / 79 y.o. / male        CHIEF COMPLAINT / REASON FOR OFFICE VISIT     Atrial fibrillation, chronic (04/30/2024 Follow up)      HISTORY OF PRESENT ILLNESS       HPI  Jerry Elmore is a 79 y.o. male who presents today for reevaluation.  Overall he is actually doing well he has no complaints and he says he feels good.  I dose his heart rate today was only 46.  I started asking about signs and symptoms of symptomatic bradycardia he did say there are episodes where he feels a little bit lightheaded/dizzy.  He says it usually occurs in the morning when he gets up and gets moving around they resolve.  No other cardiac complaints.      The following portions of the patient's history were reviewed and updated as appropriate: allergies, current medications, past family history, past medical history, past social history, past surgical history and problem list.      VITAL SIGNS     Visit Vitals  /70 (BP Location: Left arm)   Pulse (!) 46   Ht 182.9 cm (72\")   Wt 91.6 kg (202 lb)   BMI 27.40 kg/m²         Wt Readings from Last 3 Encounters:   05/02/25 91.6 kg (202 lb)   04/30/24 98.9 kg (218 lb)   04/28/23 98.9 kg (218 lb)     Body mass index is 27.4 kg/m².      REVIEW OF SYSTEMS   ROS        PHYSICAL EXAMINATION     Vitals reviewed.   Constitutional:       Appearance: Healthy appearance.   Pulmonary:      Effort: Pulmonary effort is normal.   Cardiovascular:      Normal rate. Irregularly irregular rhythm. Normal S1. Normal S2.       Murmurs: There is no murmur.      No gallop.  No click. No rub.   Pulses:     Intact distal pulses.   Edema:     Peripheral edema absent.   Neurological:      Mental Status: Alert.           REVIEWED DATA       ECG 12 Lead    Date/Time: 5/2/2025 12:40 PM  Performed by: Aram Barbosa MD    Authorized by: Aram Barbosa MD  Comparison: compared with previous ECG from 4/30/2024  Similar to " previous ECG  Rhythm: atrial fibrillation    Clinical impression: abnormal EKG          Cardiac Procedures:            ASSESSMENT & PLAN      Diagnosis Plan   1. Atrial fibrillation, chronic              SUMMARY/DISCUSSION  Chronic atrial fibrillation.  Initially thought he was doing just great.  I noticed that his heart rate was 46 today.  My start inquiring more about potential symptomatic bradycardia he was having a little bit of dizziness/lightheaded spells early in the morning.  In light of that I am to cut his metoprolol in half.  Will have him follow back up in 6 weeks with CAROLYN Heller to reassess.  We may want to consider a monitor at that time but we will see how his blood pressure and heart rates do on the decreased dose of the beta-blocker.        MEDICATIONS         Discharge Medications            Accurate as of May 2, 2025 12:40 PM. If you have any questions, ask your nurse or doctor.                Changes to Medications        Instructions Start Date   metoprolol succinate XL 25 MG 24 hr tablet  Commonly known as: TOPROL-XL  What changed: how much to take  Changed by: Aram Haraden   12.5 mg, Oral, Daily             Continue These Medications        Instructions Start Date   allopurinol 300 MG tablet  Commonly known as: ZYLOPRIM   300 mg, Daily      apixaban 5 MG tablet tablet  Commonly known as: ELIQUIS   5 mg, 2 Times Daily      gabapentin 300 MG capsule  Commonly known as: NEURONTIN   300 mg, Oral, Daily PRN      hydroCHLOROthiazide 25 MG tablet   25 mg, Daily      HYDROcodone-acetaminophen 7.5-325 MG per tablet  Commonly known as: NORCO   TAKE 1 TABLET ORALLY EVERY 4-6 HRS 30 DAYS      ipratropium 0.06 % nasal spray  Commonly known as: ATROVENT   INSTILL 2 SPRAYS IN EACH NOSTRIL THREE TIMES A DAY 30 DAY(S) 90      losartan 100 MG tablet  Commonly known as: COZAAR   100 mg, Daily      Lumigan 0.01 % ophthalmic drops  Generic drug: bimatoprost   1 drop, Every Night at Bedtime       simvastatin 5 MG tablet  Commonly known as: ZOCOR   5 mg, Nightly      Timolol Maleate (Once-Daily) 0.5 % solution   Apply  to eye.      travoprost (BAK free) 0.004 % solution ophthalmic solution  Commonly known as: TRAVATAN   1 drop, As Needed                   **Dragon Disclaimer:   Much of this encounter note is an electronic transcription/translation of spoken language to printed text. The electronic translation of spoken language may permit erroneous, or at times, nonsensical words or phrases to be inadvertently transcribed. Although I have reviewed the note for such errors, some may still exist.

## 2025-06-13 ENCOUNTER — OFFICE VISIT (OUTPATIENT)
Dept: CARDIOLOGY | Age: 80
End: 2025-06-13
Payer: MEDICARE

## 2025-06-13 VITALS
SYSTOLIC BLOOD PRESSURE: 120 MMHG | DIASTOLIC BLOOD PRESSURE: 80 MMHG | HEART RATE: 59 BPM | HEIGHT: 72 IN | BODY MASS INDEX: 27.4 KG/M2

## 2025-06-13 DIAGNOSIS — I48.20 ATRIAL FIBRILLATION, CHRONIC: Primary | ICD-10-CM

## 2025-06-13 NOTE — PROGRESS NOTES
"      CARDIOLOGY    Aram Barbosa MD    ENCOUNTER DATE:  06/13/2025    Jerry Elmore / 79 y.o. / male        CHIEF COMPLAINT / REASON FOR OFFICE VISIT     Atrial fibrillation, chronic (05/02/2025 Follow up)      HISTORY OF PRESENT ILLNESS       HPI  Jerry Elmore is a 79 y.o. male who presents today for reevaluation.  Patient continues to have episodes where he feels bad.  During these times his blood pressure is running about 105-110.  Last time I saw him I decreased his beta-blocker.  He says this has helped his symptoms quite a bit.  He still having some episodes but he says they are better than they were.      The following portions of the patient's history were reviewed and updated as appropriate: allergies, current medications, past family history, past medical history, past social history, past surgical history and problem list.      VITAL SIGNS     Visit Vitals  /80 (BP Location: Left arm)   Pulse 59   Ht 182.9 cm (72\")   BMI 27.40 kg/m²         Wt Readings from Last 3 Encounters:   05/02/25 91.6 kg (202 lb)   04/30/24 98.9 kg (218 lb)   04/28/23 98.9 kg (218 lb)     Body mass index is 27.4 kg/m².      REVIEW OF SYSTEMS   ROS        PHYSICAL EXAMINATION     Vitals reviewed.   Constitutional:       Appearance: Healthy appearance.   Pulmonary:      Effort: Pulmonary effort is normal.   Cardiovascular:      Normal rate. Irregularly irregular rhythm. Normal S1. Normal S2.       Murmurs: There is no murmur.      No gallop.  No click. No rub.   Pulses:     Intact distal pulses.   Edema:     Peripheral edema absent.   Neurological:      Mental Status: Alert.           REVIEWED DATA     Procedures    Cardiac Procedures:            ASSESSMENT & PLAN      Diagnosis Plan   1. Atrial fibrillation, chronic              SUMMARY/DISCUSSION  Atrial fibrillation.  Patient's heart rates are doing better.  He says in general was running around 60 and he is feeling significantly better since I decreased his " beta-blocker.  He still having some issues however and when he feels poorly he is blood pressures about 105-110 systolically.  Since his heart rate seems to be okay I am actually going to cut back his losartan in half down to 50 mg a day.  Will see if that helps him.  He was told to follow back up in about 8 weeks and we will reassess at that point.        MEDICATIONS         Discharge Medications            Accurate as of June 13, 2025  1:40 PM. If you have any questions, ask your nurse or doctor.                Continue These Medications        Instructions Start Date   allopurinol 300 MG tablet  Commonly known as: ZYLOPRIM   300 mg, Daily      apixaban 5 MG tablet tablet  Commonly known as: ELIQUIS   5 mg, 2 Times Daily      gabapentin 300 MG capsule  Commonly known as: NEURONTIN   300 mg, Daily PRN      hydroCHLOROthiazide 25 MG tablet   25 mg, Daily      HYDROcodone-acetaminophen 7.5-325 MG per tablet  Commonly known as: NORCO   TAKE 1 TABLET ORALLY EVERY 4-6 HRS 30 DAYS      ipratropium 0.06 % nasal spray  Commonly known as: ATROVENT   INSTILL 2 SPRAYS IN EACH NOSTRIL THREE TIMES A DAY 30 DAY(S) 90      losartan 100 MG tablet  Commonly known as: COZAAR   50 mg, Oral, Daily      Lumigan 0.01 % ophthalmic drops  Generic drug: bimatoprost   1 drop, Every Night at Bedtime      metoprolol succinate XL 25 MG 24 hr tablet  Commonly known as: TOPROL-XL   12.5 mg, Oral, Daily      simvastatin 5 MG tablet  Commonly known as: ZOCOR   5 mg, Nightly      Timolol Maleate (Once-Daily) 0.5 % solution   Apply  to eye.      travoprost (BAK free) 0.004 % solution ophthalmic solution  Commonly known as: TRAVATAN   1 drop, As Needed                   **Dragon Disclaimer:   Much of this encounter note is an electronic transcription/translation of spoken language to printed text. The electronic translation of spoken language may permit erroneous, or at times, nonsensical words or phrases to be inadvertently transcribed. Although I  have reviewed the note for such errors, some may still exist.

## 2025-08-07 ENCOUNTER — OFFICE VISIT (OUTPATIENT)
Dept: CARDIOLOGY | Age: 80
End: 2025-08-07
Payer: MEDICARE

## 2025-08-07 VITALS
OXYGEN SATURATION: 98 % | WEIGHT: 202.6 LBS | SYSTOLIC BLOOD PRESSURE: 110 MMHG | DIASTOLIC BLOOD PRESSURE: 78 MMHG | BODY MASS INDEX: 27.44 KG/M2 | HEART RATE: 68 BPM | HEIGHT: 72 IN

## 2025-08-07 DIAGNOSIS — I10 PRIMARY HYPERTENSION: ICD-10-CM

## 2025-08-07 DIAGNOSIS — I48.20 ATRIAL FIBRILLATION, CHRONIC: Primary | ICD-10-CM

## 2025-08-07 RX ORDER — DORZOLAMIDE HCL 20 MG/ML
1 SOLUTION/ DROPS OPHTHALMIC 3 TIMES DAILY
COMMUNITY

## 2025-08-07 RX ORDER — HYDROCHLOROTHIAZIDE 25 MG/1
25 TABLET ORAL DAILY PRN
Start: 2025-08-07

## 2025-08-07 RX ORDER — LATANOPROST 50 UG/ML
1 SOLUTION/ DROPS OPHTHALMIC
COMMUNITY
Start: 2025-07-15